# Patient Record
Sex: FEMALE | Race: WHITE
[De-identification: names, ages, dates, MRNs, and addresses within clinical notes are randomized per-mention and may not be internally consistent; named-entity substitution may affect disease eponyms.]

---

## 2020-10-23 ENCOUNTER — HOSPITAL ENCOUNTER (OUTPATIENT)
Dept: HOSPITAL 47 - EC | Age: 70
Setting detail: OBSERVATION
LOS: 2 days | Discharge: HOME | End: 2020-10-25
Attending: HOSPITALIST | Admitting: HOSPITALIST
Payer: MEDICARE

## 2020-10-23 DIAGNOSIS — I48.0: Primary | ICD-10-CM

## 2020-10-23 DIAGNOSIS — Z79.01: ICD-10-CM

## 2020-10-23 DIAGNOSIS — E78.5: ICD-10-CM

## 2020-10-23 DIAGNOSIS — Z79.82: ICD-10-CM

## 2020-10-23 DIAGNOSIS — Z79.899: ICD-10-CM

## 2020-10-23 DIAGNOSIS — M81.0: ICD-10-CM

## 2020-10-23 DIAGNOSIS — I10: ICD-10-CM

## 2020-10-23 DIAGNOSIS — F17.200: ICD-10-CM

## 2020-10-23 DIAGNOSIS — Z79.83: ICD-10-CM

## 2020-10-23 LAB
ALBUMIN SERPL-MCNC: 4.5 G/DL (ref 3.5–5)
ALP SERPL-CCNC: 65 U/L (ref 38–126)
ALT SERPL-CCNC: 15 U/L (ref 4–34)
ANION GAP SERPL CALC-SCNC: 7 MMOL/L
APTT BLD: 23.2 SEC (ref 22–30)
AST SERPL-CCNC: 35 U/L (ref 14–36)
BASOPHILS # BLD AUTO: 0 K/UL (ref 0–0.2)
BASOPHILS NFR BLD AUTO: 0 %
BUN SERPL-SCNC: 19 MG/DL (ref 7–17)
CALCIUM SPEC-MCNC: 9.4 MG/DL (ref 8.4–10.2)
CHLORIDE SERPL-SCNC: 105 MMOL/L (ref 98–107)
CO2 SERPL-SCNC: 26 MMOL/L (ref 22–30)
EOSINOPHIL # BLD AUTO: 0 K/UL (ref 0–0.7)
EOSINOPHIL NFR BLD AUTO: 0 %
ERYTHROCYTE [DISTWIDTH] IN BLOOD BY AUTOMATED COUNT: 4.77 M/UL (ref 3.8–5.4)
ERYTHROCYTE [DISTWIDTH] IN BLOOD: 13 % (ref 11.5–15.5)
GLUCOSE SERPL-MCNC: 105 MG/DL (ref 74–99)
HCT VFR BLD AUTO: 44.7 % (ref 34–46)
HGB BLD-MCNC: 14.8 GM/DL (ref 11.4–16)
HYALINE CASTS UR QL AUTO: 1 /LPF (ref 0–2)
INR PPP: 0.9 (ref ?–1.2)
LYMPHOCYTES # SPEC AUTO: 1.8 K/UL (ref 1–4.8)
LYMPHOCYTES NFR SPEC AUTO: 16 %
MAGNESIUM SPEC-SCNC: 1.9 MG/DL (ref 1.6–2.3)
MCH RBC QN AUTO: 31 PG (ref 25–35)
MCHC RBC AUTO-ENTMCNC: 33.1 G/DL (ref 31–37)
MCV RBC AUTO: 93.8 FL (ref 80–100)
MONOCYTES # BLD AUTO: 0.5 K/UL (ref 0–1)
MONOCYTES NFR BLD AUTO: 4 %
NEUTROPHILS # BLD AUTO: 8.3 K/UL (ref 1.3–7.7)
NEUTROPHILS NFR BLD AUTO: 78 %
PH UR: 7 [PH] (ref 5–8)
PLATELET # BLD AUTO: 325 K/UL (ref 150–450)
POTASSIUM SERPL-SCNC: 4.4 MMOL/L (ref 3.5–5.1)
PROT SERPL-MCNC: 7.2 G/DL (ref 6.3–8.2)
PT BLD: 9.6 SEC (ref 9–12)
RBC UR QL: 10 /HPF (ref 0–5)
SODIUM SERPL-SCNC: 138 MMOL/L (ref 137–145)
SP GR UR: 1.01 (ref 1–1.03)
SQUAMOUS UR QL AUTO: <1 /HPF (ref 0–4)
UROBILINOGEN UR QL STRIP: <2 MG/DL (ref ?–2)
WBC # BLD AUTO: 10.7 K/UL (ref 3.8–10.6)
WBC #/AREA URNS HPF: 3 /HPF (ref 0–5)

## 2020-10-23 PROCEDURE — 96375 TX/PRO/DX INJ NEW DRUG ADDON: CPT

## 2020-10-23 PROCEDURE — 36415 COLL VENOUS BLD VENIPUNCTURE: CPT

## 2020-10-23 PROCEDURE — 96361 HYDRATE IV INFUSION ADD-ON: CPT

## 2020-10-23 PROCEDURE — 84443 ASSAY THYROID STIM HORMONE: CPT

## 2020-10-23 PROCEDURE — 96366 THER/PROPH/DIAG IV INF ADDON: CPT

## 2020-10-23 PROCEDURE — 93306 TTE W/DOPPLER COMPLETE: CPT

## 2020-10-23 PROCEDURE — 85025 COMPLETE CBC W/AUTO DIFF WBC: CPT

## 2020-10-23 PROCEDURE — 80061 LIPID PANEL: CPT

## 2020-10-23 PROCEDURE — 83735 ASSAY OF MAGNESIUM: CPT

## 2020-10-23 PROCEDURE — 96365 THER/PROPH/DIAG IV INF INIT: CPT

## 2020-10-23 PROCEDURE — 96376 TX/PRO/DX INJ SAME DRUG ADON: CPT

## 2020-10-23 PROCEDURE — 81001 URINALYSIS AUTO W/SCOPE: CPT

## 2020-10-23 PROCEDURE — 80048 BASIC METABOLIC PNL TOTAL CA: CPT

## 2020-10-23 PROCEDURE — 99285 EMERGENCY DEPT VISIT HI MDM: CPT

## 2020-10-23 PROCEDURE — 85730 THROMBOPLASTIN TIME PARTIAL: CPT

## 2020-10-23 PROCEDURE — 71046 X-RAY EXAM CHEST 2 VIEWS: CPT

## 2020-10-23 PROCEDURE — 85610 PROTHROMBIN TIME: CPT

## 2020-10-23 PROCEDURE — 84484 ASSAY OF TROPONIN QUANT: CPT

## 2020-10-23 PROCEDURE — 80053 COMPREHEN METABOLIC PANEL: CPT

## 2020-10-23 PROCEDURE — 93005 ELECTROCARDIOGRAM TRACING: CPT

## 2020-10-23 RX ADMIN — METOPROLOL TARTRATE SCH MG: 25 TABLET, FILM COATED ORAL at 20:24

## 2020-10-23 RX ADMIN — ATORVASTATIN CALCIUM SCH MG: 20 TABLET, FILM COATED ORAL at 20:24

## 2020-10-23 RX ADMIN — METOPROLOL TARTRATE SCH MG: 25 TABLET, FILM COATED ORAL at 15:11

## 2020-10-23 RX ADMIN — METOPROLOL TARTRATE SCH MG: 1 INJECTION, SOLUTION INTRAVENOUS at 14:29

## 2020-10-23 RX ADMIN — METOPROLOL TARTRATE SCH: 1 INJECTION, SOLUTION INTRAVENOUS at 15:10

## 2020-10-23 NOTE — ED
General Adult HPI





- General


Chief complaint: Arrhythmia/Palpitations


Stated complaint: AFib - sent by pcp


Time Seen by Provider: 10/23/20 12:55


Source: patient, family, RN notes reviewed, old records reviewed


Mode of arrival: ambulatory


Limitations: no limitations





- History of Present Illness


Initial comments: 





This is a 70-year-old female who presents emergency Department complaining of 

having atrial fibrillation.  Patient noticed recently that her heart was beating

irregularly and she she went to her primary medical care doctor today and they 

sent to the emergency department.  Patient does not have any chest pain patient 

denies any shortness of breath or difficulty breathing.  Patient does feel her 

heart beating differently.  Patient denies any headache patient denies 

lightheadedness dizziness or near syncopal episode.  Patient denies any abd

ominal pain patient denies nausea vomiting or diarrhea.





- Related Data


                                Home Medications











 Medication  Instructions  Recorded  Confirmed


 


Alendronate Sodium [Fosamax] 70 mg PO GARDNER 10/23/20 10/23/20


 


Aspirin EC [Ecotrin Low Dose] 81 mg PO DAILY 10/23/20 10/23/20


 


Atorvastatin [Lipitor] 20 mg PO HS 10/23/20 10/23/20


 


Losartan Potassium [Cozaar] 100 mg PO DAILY 10/23/20 10/23/20


 


Psyllium Husk 100% [Metamucil 6 gm PO DAILY 10/23/20 10/23/20





Packet]   


 


Ubidecarenone [Co Q-10] 200 mg PO DAILY 10/23/20 10/23/20


 


amLODIPine [Norvasc] 10 mg PO DAILY 10/23/20 10/23/20


 


hydroCHLOROthiazide [Hydrodiuril] 25 mg PO DAILY 10/23/20 10/23/20











                                    Allergies











Allergy/AdvReac Type Severity Reaction Status Date / Time


 


diltiazem [From Cardizem] Allergy  Rash/Hives Verified 10/23/20 13:01














Review of Systems


ROS Statement: 


Those systems with pertinent positive or pertinent negative responses have been 

documented in the HPI.





ROS Other: All systems not noted in ROS Statement are negative.





Past Medical History


Past Medical History: Atrial Fibrillation, Hyperlipidemia, Hypertension


Additional Past Medical History / Comment(s): osteoperosis


History of Any Multi-Drug Resistant Organisms: None Reported


Past Surgical History: Tubal Ligation


Past Psychological History: No Psychological Hx Reported


Smoking Status: Current every day smoker


Past Alcohol Use History: None Reported


Past Drug Use History: None Reported





General Exam





- General Exam Comments


Initial Comments: 





GENERAL:


Patient is well-developed and well-nourished.  Patient is nontoxic and well-

hydrated and is in no acute distress.





ENT:


Neck is soft and supple.  No significant lymphadenopathy is noted.  Oropharynx 

is clear.  Moist mucous membranes.  Neck has full range of motion without 

eliciting any pain.  





EYES:


The sclera were anicteric and conjunctiva were pink and moist.  Extraocular 

movements were intact and pupils were equal round and reactive to light.  

Eyelids were unremarkable.





PULMONARY:


Unlabored respirations.  Good breath sounds bilaterally.  No audible rales 

rhonchi or wheezing was noted.





CARDIOVASCULAR:


Patient is tachycardic with a regular rate and rhythm it's at about 130 beats a 

minute





ABDOMEN:


Soft and nontender with normal bowel sounds. 





SKIN:


Skin is clear with no lesions or rashes and otherwise unremarkable.





NEUROLOGIC:


Patient is alert and oriented x3.  Cranial nerves II through XII are grossly 

intact.  Motor and sensory are also intact.  Normal speech, volume and content. 

Symmetrical smile. 





MUSCULOSKELETAL:


Normal extremities with adequate strength and full range of motion.  No lower 

extremity swelling or edema.  No calf tenderness.





LYMPHATICS:


No significant lymphadenopathy is noted





PSYCHIATRIC:


Normal psychiatric evaluation. 


Limitations: no limitations





Course


                                   Vital Signs











  10/23/20 10/23/20 10/23/20





  12:55 13:30 14:29


 


Temperature 98.7 F  


 


Pulse Rate 87 135 H 103 H


 


Respiratory 18 18 18





Rate   


 


Blood Pressure 124/62 136/109 119/76


 


O2 Sat by Pulse 100 97 98





Oximetry   














  10/23/20 10/23/20





  14:30 14:36


 


Temperature  


 


Pulse Rate 128 H 95


 


Respiratory 18 18





Rate  


 


Blood Pressure 119/76 138/85


 


O2 Sat by Pulse 99 97





Oximetry  














Medical Decision Making





- Medical Decision Making





EKG shows atrial for ablation with rapid ventricular response at 124 bpm QRS is 

78 QT interval is 294 QTC is 422 per patient's EKG shows no ST segment elevation

however there is some T-wave inversion in precordial leads V5 and V6.





Chest x-ray shows no acute abnormality.





Patient is ALLERGIC to Cardizem so I started the patient on IV metoprolol.





[facility] 

the patient wrote admitting orders I consult cardiology and I continued 

metoprolol by mouth.





- Lab Data


Result diagrams: 


                                 10/23/20 13:28





                                 10/23/20 13:28


                                   Lab Results











  10/23/20 10/23/20 10/23/20 Range/Units





  13:28 13:28 13:28 


 


WBC  10.7 H    (3.8-10.6)  k/uL


 


RBC  4.77    (3.80-5.40)  m/uL


 


Hgb  14.8    (11.4-16.0)  gm/dL


 


Hct  44.7    (34.0-46.0)  %


 


MCV  93.8    (80.0-100.0)  fL


 


MCH  31.0    (25.0-35.0)  pg


 


MCHC  33.1    (31.0-37.0)  g/dL


 


RDW  13.0    (11.5-15.5)  %


 


Plt Count  325    (150-450)  k/uL


 


Neutrophils %  78    %


 


Lymphocytes %  16    %


 


Monocytes %  4    %


 


Eosinophils %  0    %


 


Basophils %  0    %


 


Neutrophils #  8.3 H    (1.3-7.7)  k/uL


 


Lymphocytes #  1.8    (1.0-4.8)  k/uL


 


Monocytes #  0.5    (0-1.0)  k/uL


 


Eosinophils #  0.0    (0-0.7)  k/uL


 


Basophils #  0.0    (0-0.2)  k/uL


 


PT   9.6   (9.0-12.0)  sec


 


INR   0.9   (<1.2)  


 


APTT   23.2   (22.0-30.0)  sec


 


Sodium    138  (137-145)  mmol/L


 


Potassium    4.4  (3.5-5.1)  mmol/L


 


Chloride    105  ()  mmol/L


 


Carbon Dioxide    26  (22-30)  mmol/L


 


Anion Gap    7  mmol/L


 


BUN    19 H  (7-17)  mg/dL


 


Creatinine    0.75  (0.52-1.04)  mg/dL


 


Est GFR (CKD-EPI)AfAm    >90  (>60 ml/min/1.73 sqM)  


 


Est GFR (CKD-EPI)NonAf    81  (>60 ml/min/1.73 sqM)  


 


Glucose    105 H  (74-99)  mg/dL


 


Calcium    9.4  (8.4-10.2)  mg/dL


 


Magnesium    1.9  (1.6-2.3)  mg/dL


 


Total Bilirubin    1.1  (0.2-1.3)  mg/dL


 


AST    35  (14-36)  U/L


 


ALT    15  (4-34)  U/L


 


Alkaline Phosphatase    65  ()  U/L


 


Troponin I     (0.000-0.034)  ng/mL


 


Total Protein    7.2  (6.3-8.2)  g/dL


 


Albumin    4.5  (3.5-5.0)  g/dL


 


TSH    1.510  (0.465-4.680)  mIU/L














  10/23/20 Range/Units





  13:28 


 


WBC   (3.8-10.6)  k/uL


 


RBC   (3.80-5.40)  m/uL


 


Hgb   (11.4-16.0)  gm/dL


 


Hct   (34.0-46.0)  %


 


MCV   (80.0-100.0)  fL


 


MCH   (25.0-35.0)  pg


 


MCHC   (31.0-37.0)  g/dL


 


RDW   (11.5-15.5)  %


 


Plt Count   (150-450)  k/uL


 


Neutrophils %   %


 


Lymphocytes %   %


 


Monocytes %   %


 


Eosinophils %   %


 


Basophils %   %


 


Neutrophils #   (1.3-7.7)  k/uL


 


Lymphocytes #   (1.0-4.8)  k/uL


 


Monocytes #   (0-1.0)  k/uL


 


Eosinophils #   (0-0.7)  k/uL


 


Basophils #   (0-0.2)  k/uL


 


PT   (9.0-12.0)  sec


 


INR   (<1.2)  


 


APTT   (22.0-30.0)  sec


 


Sodium   (137-145)  mmol/L


 


Potassium   (3.5-5.1)  mmol/L


 


Chloride   ()  mmol/L


 


Carbon Dioxide   (22-30)  mmol/L


 


Anion Gap   mmol/L


 


BUN   (7-17)  mg/dL


 


Creatinine   (0.52-1.04)  mg/dL


 


Est GFR (CKD-EPI)AfAm   (>60 ml/min/1.73 sqM)  


 


Est GFR (CKD-EPI)NonAf   (>60 ml/min/1.73 sqM)  


 


Glucose   (74-99)  mg/dL


 


Calcium   (8.4-10.2)  mg/dL


 


Magnesium   (1.6-2.3)  mg/dL


 


Total Bilirubin   (0.2-1.3)  mg/dL


 


AST   (14-36)  U/L


 


ALT   (4-34)  U/L


 


Alkaline Phosphatase   ()  U/L


 


Troponin I  <0.012  (0.000-0.034)  ng/mL


 


Total Protein   (6.3-8.2)  g/dL


 


Albumin   (3.5-5.0)  g/dL


 


TSH   (0.465-4.680)  mIU/L














Critical Care Time


Critical Care Time: Yes


Total Critical Care Time: 35





Disposition


Clinical Impression: 


 Atrial fibrillation with rapid ventricular response





Disposition: ADMITTED AS IP TO THIS HOSP


Referrals: 


Srinivasan Olivares DO [Primary Care Provider] - 1-2 days


Time of Disposition: 14:50

## 2020-10-23 NOTE — XR
EXAMINATION TYPE: XR chest 2V

 

DATE OF EXAM: 10/23/2020

 

COMPARISON: NONE

 

HISTORY: Dysrhythmia

 

TECHNIQUE:  Frontal and lateral views of the chest are obtained.

 

FINDINGS: Patient is rotated. There are overlying cardiac leads. Prominent lung volumes with flatteni
ng the hemidiaphragms may be indicative of underlying COPD. There is no focal air space opacity, pleu
ral effusion, or pneumothorax seen.  The cardiac silhouette size is within normal limits.   The osseo
us structures are intact, there is likely an underlying scoliosis.

 

IMPRESSION:  No acute cardiopulmonary process.

## 2020-10-24 VITALS — RESPIRATION RATE: 16 BRPM

## 2020-10-24 LAB
ANION GAP SERPL CALC-SCNC: 7 MMOL/L
BASOPHILS # BLD AUTO: 0.1 K/UL (ref 0–0.2)
BASOPHILS NFR BLD AUTO: 1 %
BUN SERPL-SCNC: 17 MG/DL (ref 7–17)
CALCIUM SPEC-MCNC: 9.3 MG/DL (ref 8.4–10.2)
CHLORIDE SERPL-SCNC: 106 MMOL/L (ref 98–107)
CHOLEST SERPL-MCNC: 160 MG/DL (ref ?–200)
CO2 SERPL-SCNC: 27 MMOL/L (ref 22–30)
EOSINOPHIL # BLD AUTO: 0.1 K/UL (ref 0–0.7)
EOSINOPHIL NFR BLD AUTO: 1 %
ERYTHROCYTE [DISTWIDTH] IN BLOOD BY AUTOMATED COUNT: 4.6 M/UL (ref 3.8–5.4)
ERYTHROCYTE [DISTWIDTH] IN BLOOD: 13 % (ref 11.5–15.5)
GLUCOSE SERPL-MCNC: 101 MG/DL (ref 74–99)
HCT VFR BLD AUTO: 43.2 % (ref 34–46)
HDLC SERPL-MCNC: 43 MG/DL (ref 40–60)
HGB BLD-MCNC: 14 GM/DL (ref 11.4–16)
LDLC SERPL CALC-MCNC: 95 MG/DL (ref 0–99)
LYMPHOCYTES # SPEC AUTO: 2.6 K/UL (ref 1–4.8)
LYMPHOCYTES NFR SPEC AUTO: 35 %
MCH RBC QN AUTO: 30.4 PG (ref 25–35)
MCHC RBC AUTO-ENTMCNC: 32.3 G/DL (ref 31–37)
MCV RBC AUTO: 94 FL (ref 80–100)
MONOCYTES # BLD AUTO: 0.5 K/UL (ref 0–1)
MONOCYTES NFR BLD AUTO: 6 %
NEUTROPHILS # BLD AUTO: 4 K/UL (ref 1.3–7.7)
NEUTROPHILS NFR BLD AUTO: 55 %
PLATELET # BLD AUTO: 279 K/UL (ref 150–450)
POTASSIUM SERPL-SCNC: 4.1 MMOL/L (ref 3.5–5.1)
SODIUM SERPL-SCNC: 140 MMOL/L (ref 137–145)
TRIGL SERPL-MCNC: 111 MG/DL (ref ?–150)
WBC # BLD AUTO: 7.4 K/UL (ref 3.8–10.6)

## 2020-10-24 RX ADMIN — APIXABAN SCH MG: 5 TABLET, FILM COATED ORAL at 20:23

## 2020-10-24 RX ADMIN — Medication SCH UNIT: at 09:49

## 2020-10-24 RX ADMIN — METOPROLOL TARTRATE SCH MG: 25 TABLET, FILM COATED ORAL at 09:48

## 2020-10-24 RX ADMIN — METOPROLOL TARTRATE SCH MG: 25 TABLET, FILM COATED ORAL at 21:50

## 2020-10-24 RX ADMIN — APIXABAN SCH MG: 5 TABLET, FILM COATED ORAL at 12:37

## 2020-10-24 RX ADMIN — METOPROLOL TARTRATE SCH MG: 25 TABLET, FILM COATED ORAL at 17:23

## 2020-10-24 RX ADMIN — ATORVASTATIN CALCIUM SCH MG: 20 TABLET, FILM COATED ORAL at 20:23

## 2020-10-24 RX ADMIN — LOSARTAN POTASSIUM SCH MG: 50 TABLET, FILM COATED ORAL at 09:49

## 2020-10-24 NOTE — DS
DISCHARGE SUMMARY



DATE OF SERVICE:

10/24/2020



FINAL DIAGNOSES:

1. Atrial fibrillation with fast ventricular rate with paroxysmal atrial fibrillation.

2. Increased WBC.

3. History of paroxysmal atrial fibrillation.

4. Hypertension.

5. Hyperlipidemia.

6. History of osteoporosis.

7. History of tubal ligation.

8. Continued ongoing nicotine dependence.



DISCHARGE DISPOSITION:

Patient will be discharged in stable condition with guarded prognosis. Cardiology

cleared the patient for discharge.



HISTORY OF PRESENT ILLNESS:

This 70-year-old woman with a past medical history of multiple medical problems

admitted with atrial fibrillation with fast ventricular rate.  Patient was treated

medically.  Patient improved significantly cardiology saw the patient and recommended

anticoagulation with Eliquis.



On exam, vitals are stable.  Cardiovascular: S1, S2.  Abdomen soft.  Nervous system: No

focal deficits.



DISCHARGE ADVICE AND MEDICATIONS:

1. Diet is cardiac diet.

2. Activity limited until followup.

3. Follow up with Dr. Olivares in 1-2 days.

4. Follow up with Cardiology as recommended.



DISCHARGE MEDICATIONS:

1. Coenzyme Q10 100 mg daily.

2. Cozaar 100 mg daily.

3. Ecotrin 81 mg daily.

4. Fosamax 70 mg Sunday.

5. HydroDIURIL 25 mg.

6. Lipitor 20 mg q.h.s.

7. Psyllium husk 6 g p.o. daily.

8. Norvasc 10 mg p.o. daily.

9. Vitamin D3 2000 daily.

10.Eliquis 5 mg p.o. b.i.d.

11.Lopressor 25 mg p.o. t.i.d.

Once again the patient discharged in stable condition with guarded prognosis.





MMODL / IJN: [id number] / Job#: [id number] This writer spoke with patient's son this AM at the request of this patient. Per son, patient and son were told that patient would be discharging this morning and son was wondering what time he should get his mom. Per latest MD &  notes, patient should DC either Sunday or Monday. Son, again, stated that he had personally talked to the MD about discharging his mother today instead of Sun or Mon. This writer apologized for the misunderstanding and reassured the son that the MD can touch base with him again today to talk about discharge planning. Oncoming RN aware.

## 2020-10-24 NOTE — PN
PROGRESS NOTE



DATE OF SERVICE:

10/24/2020



This 70-year-old woman was admitted with atrial fibrillation with fast ventricular rate

is being closely monitored.  No chest pain.  No palpitations.  No fever.



PHYSICAL EXAMINATION:

Alert and oriented times three. Pulse 87, blood pressure 100/60.  Respirations 16,

temperature 97.8, pulse ox 97% on room air.

HEENT is conjunctivae normal.

NECK: No JVD.

CARDIOVASCULAR: S1, S2 normal.

RESPIRATORY:  Breath sounds diminished in the bases.  A few rhonchi.  No crackles.

ABDOMEN:  Soft.

NERVOUS SYSTEM: No focal deficits.



LABS:

Noted.



ASSESSMENT:

1. Atrial fibrillation with fast ventricular rate with paroxysmal atrial fibrillation.

2. Increased WBC.

3. History of paroxysmal atrial fibrillation.

4. Hypertension.

5. Hyperlipidemia.

6. History of degenerative joint disease.

7. History of tubal ligation.

8. Continued ongoing nicotine dependence.



RECOMMENDATIONS AND DISCUSSION:

Recommend to continue current medications, management and symptomatic treatment.

Continue Eliquis.  Closely follow with Cardiology.  Guarded prognosis. Further

recommendations to follow.





MMODL / IJN: [id number] / Job#: [id number]

## 2020-10-24 NOTE — P.CRDCN
History of Present Illness


Consult date: 10/24/20


History of present illness: 





CHIEF COMPLAINT:  A. flavia with RVR





HISTORY OF PRESENT ILLNESS:  This is a 70-year old female with a past medical 

history significant for hypertension, hyperlipidemia, and atrial fibrillation 

with cardioversion. Patient states she does not follow with a cardiologist.  We 

have been asked to see the patient in consultation for A. flavia with RVR.  Patient

examined this morning at the bedside.  Patient states she has been feeling tired

for the past 2-3 days and hasn't really known why.  She denies any complaints of

chest pain or pressure.  Denies shortness of breath.  Denies palpitations.  She 

states that she had her  listen to her heart and he told her it was 

beating fast and beating irregularly so she came to the hospital for further 

evaluation.  Patient reports having a history of atrial fibrillation 

approximately 10 years ago.  She was treated with Coumadin until she underwent a

successful cardioversion.





DIAGNOSTICS:


EKG reveals atrial fibrillation with RVR


Chest xray negative for acute cardiopulmonary process


Laboratory data: WBC 10.7.  Hemoglobin 14.8.  Platelet count 325.  Sodium 138.  

Potassium 4.4.  BUN 19.  Creatinine 0.75.  Magnesium 1.9.  Troponin negative 3.


Current home cardiac medications include Lipitor 20 mg daily, aspirin 81 mg 

daily, hydrochlorothiazide 25 mg daily, Norvasc 10 mg daily, and Cozaar 100 mg 

daily.





REVIEW OF SYSTEMS: 


At the time of my exam:


CONSTITUTIONAL: Denies fever or chills.


HEENT: Denies blurred vision, vision changes, or eye pain. Denies hemoptysis 


CARDIOVASCULAR: Denies chest pain, orthopnea, PND or palpitations


RESPIRATORY: No shortness of breath. 


GASTROINTESTINAL: Denies abdominal pain. Denies nausea or vomiting. 


HEMATOLOGIC: Denies bleeding disorders.


GENITOURINARY:  Denies any blood in urine.


SKIN: Denies pruitis. Denies rash.





PHYSICAL EXAM: 


VITAL SIGNS: Reviewed.


GENERAL: Well-developed in no acute distress. 


HEENT: Head is normocephalic. Pupils are equal, round. Sclerae anicteric. Mucous

membranes of the mouth are moist. Neck supple. No JVD or thyromegaly


LUNGS: Respirations even and unlabored. Lungs essentially clear to auscultation 

bilaterally.


HEART: Irregular rate and rhythm.  S1 and S2 heard.


ABDOMEN: Soft. Nondistended. Nontender.


EXTREMITIES: Normal range of motion.  No clubbing or cyanosis.  Peripheral 

pulses intact.  No lower extremity edema


NEUROLOGIC: Awake and alert. Oriented x 3. 





ASSESSMENT: 


Paroxysmal atrial fibrillation with RVR


History of cardioversion, 10 years ago


Hypertension


Hyperlipidemia


Nicotine dependence





PLAN: 


Resume home cardiac medications


Begin metoprolol 25 mg TID


Rythmol 450 mg 1 dose now


Begin Eliquis 5 mg.  Discontinue heparin drip 2 hours after Eliquis dose given


Obtain 2-D echo to assess cardiac structure and function


Observe patient overnight per Dr. Lynch.  Anticipate discharge home tomorrow.





Nurse practitioner note has been reviewed by physician. Signing provider agrees 

with the documented findings, assessment, and plan of care. 








Past Medical History


Past Medical History: Atrial Fibrillation, Hyperlipidemia, Hypertension


Additional Past Medical History / Comment(s): osteoperosis


History of Any Multi-Drug Resistant Organisms: None Reported


Past Surgical History: Tubal Ligation


Additional Past Surgical History / Comment(s): cardioversion approx 10 years ago

[facility] by Dr Sandoval, Breast biopsy


Past Anesthesia/Blood Transfusion Reactions: No Reported Reaction


Past Psychological History: No Psychological Hx Reported


Smoking Status: Current every day smoker


Past Alcohol Use History: None Reported


Past Drug Use History: None Reported





- Past Family History


  ** Mother


Family Medical History: Cancer


Additional Family Medical History / Comment(s): cancer of the esphagus





  ** Father


Family Medical History: Memory Impairment


Additional Family Medical History / Comment(s): Alzheimers





Medications and Allergies


                                Home Medications











 Medication  Instructions  Recorded  Confirmed  Type


 


Alendronate Sodium [Fosamax] 70 mg PO GARDNER 10/23/20 10/23/20 History


 


Aspirin EC [Ecotrin Low Dose] 81 mg PO DAILY 10/23/20 10/23/20 History


 


Atorvastatin [Lipitor] 20 mg PO HS 10/23/20 10/23/20 History


 


Cholecalciferol [Vitamin D3 (25 2,000 unit PO DAILY 10/23/20 10/23/20 History





Mcg = 1000 Iu)]    


 


Losartan Potassium [Cozaar] 100 mg PO DAILY 10/23/20 10/23/20 History


 


Psyllium Husk 100% [Metamucil 6 gm PO DAILY 10/23/20 10/23/20 History





Packet]    


 


Ubidecarenone [Co Q-10] 200 mg PO DAILY 10/23/20 10/23/20 History


 


amLODIPine [Norvasc] 10 mg PO DAILY 10/23/20 10/23/20 History


 


hydroCHLOROthiazide [Hydrodiuril] 25 mg PO DAILY 10/23/20 10/23/20 History


 


Apixaban [Eliquis] 5 mg PO BID #60 tab 10/24/20  Rx


 


Metoprolol Tartrate [Lopressor] 25 mg PO TID #90 tab 10/24/20  Rx








                                    Allergies











Allergy/AdvReac Type Severity Reaction Status Date / Time


 


diltiazem [From Cardizem] Allergy  Rash/Hives Verified 10/23/20 13:01














Physical Exam


Vitals: 


                                   Vital Signs











  Temp Pulse Pulse Resp BP BP Pulse Ox


 


 10/24/20 09:00  98.1 F   89  16   136/83  98


 


 10/24/20 02:40  97.9 F   86    136/80  95


 


 10/23/20 20:07  98.0 F   98  18   110/69  97


 


 10/23/20 19:45    98    


 


 10/23/20 17:00  98.2 F   106 H  16   123/73  98


 


 10/23/20 16:00   101 H   17  116/75   97


 


 10/23/20 15:30   83   18  114/86   98


 


 10/23/20 15:00   120 H   13  140/98   97


 


 10/23/20 14:36   95   18  138/85   97


 


 10/23/20 14:30   128 H   18  119/76   99


 


 10/23/20 14:29   103 H   18  119/76   98


 


 10/23/20 13:30   135 H   18  136/109   97


 


 10/23/20 12:55  98.7 F  87   18  124/62   100








                                Intake and Output











 10/23/20 10/24/20 10/24/20





 22:59 06:59 14:59


 


Intake Total 239.816 49.193 0


 


Output Total 250  


 


Balance -10.184 49.193 0


 


Intake:   


 


  Intake, IV Titration 49.816 49.193 





  Amount   


 


    Heparin Sod,Pork in 0.45% 49.816 49.193 





    NaCl 25,000 unit In 0.45   





    % NaCl 1 250ml.bag @ 12   





    UNITS/KG/HR 7.185 mls/hr   





    IV .Q24H [facility] Rx#:   





    [id number]   


 


  Oral 190  0


 


Output:   


 


  Urine 250  


 


Other:   


 


  # Voids 1  1


 


  Weight 59.874 kg  














Results





                                 10/24/20 04:30





                                 10/24/20 04:30


                                 Cardiac Enzymes











  10/23/20 10/23/20 10/23/20 Range/Units





  13:28 13:28 16:05 


 


AST  35    (14-36)  U/L


 


Troponin I   <0.012  <0.012  (0.000-0.034)  ng/mL














  10/23/20 Range/Units





  20:06 


 


AST   (14-36)  U/L


 


Troponin I  <0.012  (0.000-0.034)  ng/mL








                                   Coagulation











  10/23/20 10/23/20 10/24/20 Range/Units





  13:28 20:06 04:30 


 


PT  9.6    (9.0-12.0)  sec


 


APTT  23.2  68.8 H  38.4 H  (22.0-30.0)  sec








                                     Lipids











  10/24/20 Range/Units





  04:30 


 


Triglycerides  111  (<150)  mg/dL


 


Cholesterol  160  (<200)  mg/dL


 


HDL Cholesterol  43  (40-60)  mg/dL








                                       CBC











  10/23/20 10/24/20 Range/Units





  13:28 04:30 


 


WBC  10.7 H  7.4  (3.8-10.6)  k/uL


 


RBC  4.77  4.60  (3.80-5.40)  m/uL


 


Hgb  14.8  14.0  (11.4-16.0)  gm/dL


 


Hct  44.7  43.2  (34.0-46.0)  %


 


Plt Count  325  279  (150-450)  k/uL








                          Comprehensive Metabolic Panel











  10/23/20 10/24/20 Range/Units





  13:28 04:30 


 


Sodium  138  140  (137-145)  mmol/L


 


Potassium  4.4  4.1  (3.5-5.1)  mmol/L


 


Chloride  105  106  ()  mmol/L


 


Carbon Dioxide  26  27  (22-30)  mmol/L


 


BUN  19 H  17  (7-17)  mg/dL


 


Creatinine  0.75  0.71  (0.52-1.04)  mg/dL


 


Glucose  105 H  101 H  (74-99)  mg/dL


 


Calcium  9.4  9.3  (8.4-10.2)  mg/dL


 


AST  35   (14-36)  U/L


 


ALT  15   (4-34)  U/L


 


Alkaline Phosphatase  65   ()  U/L


 


Total Protein  7.2   (6.3-8.2)  g/dL


 


Albumin  4.5   (3.5-5.0)  g/dL








                               Current Medications











Generic Name Dose Route Start Last Admin





  Trade Name Lola  PRN Reason Stop Dose Admin


 


Amlodipine Besylate  10 mg  10/24/20 09:00  10/24/20 09:48





  Amlodipine 10 Mg Tab  PO   10 mg





  DAILY JULIANA   Administration


 


Atorvastatin Calcium  20 mg  10/23/20 21:00  10/23/20 20:24





  Atorvastatin 20 Mg Tab  PO   20 mg





  HS JULIANA   Administration


 


Cholecalciferol  2,000 unit  10/24/20 09:00  10/24/20 09:49





  Cholecalciferol 1,000 Unit Tab  PO   2,000 unit





  DAILY JULIANA   Administration


 


Heparin Sodium (Porcine)  0 unit  10/24/20 05:28  10/24/20 05:42





  Heparin Sodium,Porcine 5,000 Unit/Ml 1 Ml Vial  IV   1,496 unit





  PER PROTOCOL PRN   Administration





  Low PTT  





  Protocol  


 


Hydrochlorothiazide  25 mg  10/24/20 09:00  10/24/20 09:48





  Hydrochlorothiazide 25 Mg Tab  PO   25 mg





  DAILY JULIANA   Administration


 


Heparin Sodium/Sodium Chloride  250 mls @ 7.185 mls/hr  10/23/20 13:30  10/24/20

05:24





  25,000 unit/ Sodium Chloride  IV   12 units/kg/hr





  .Q24H JULIANA   7.185 mls/hr





    Titration





  Protocol  





  12 UNITS/KG/HR  


 


Losartan Potassium  100 mg  10/24/20 09:00  10/24/20 09:49





  Losartan 50 Mg Tab  PO   100 mg





  DAILY JULIANA   Administration


 


Metoprolol Tartrate  25 mg  10/23/20 15:00  10/24/20 09:48





  Metoprolol Tartrate 25 Mg Tab  PO   25 mg





  BID JULIANA   Administration


 


Nitroglycerin  0.4 mg  10/23/20 14:51 





  Nitroglycerin Sl Tabs 0.4 Mg Tab  SUBLINGUAL  





  Q5M PRN  





  Chest Pain  


 


Psyllium Hydrophilic Mucilloid  6 gm  10/24/20 21:00 





  Psyllium Husk 100% 6 Gm Packet  PO  





  DAILY@2100 JULIANA  








                                Intake and Output











 10/23/20 10/24/20 10/24/20





 22:59 06:59 14:59


 


Intake Total 239.816 49.193 0


 


Output Total 250  


 


Balance -10.184 49.193 0


 


Intake:   


 


  Intake, IV Titration 49.816 49.193 





  Amount   


 


    Heparin Sod,Pork in 0.45% 49.816 49.193 





    NaCl 25,000 unit In 0.45   





    % NaCl 1 250ml.bag @ 12   





    UNITS/KG/HR 7.185 mls/hr   





    IV .Q24H [facility] Rx#:   





    [id number]   


 


  Oral 190  0


 


Output:   


 


  Urine 250  


 


Other:   


 


  # Voids 1  1


 


  Weight 59.874 kg  








                                        





                                 10/24/20 04:30 





                                 10/24/20 04:30

## 2020-10-24 NOTE — ECHOF
Referral Reason:A. Fib



MEASUREMENTS

--------

HEIGHT: 165.1 cm

WEIGHT: 59.9 kg

BP: 

RVIDd:   2.9 cm     (< 3.3)

IVSd:   1.1 cm     (0.6 - 1.1)

LVIDd:   4.5 cm     (3.9 - 5.3)

LVPWd:   1.3 cm     (0.6 - 1.1)

IVSs:   1.5 cm

LVIDs:   3.4 cm

LVPWs:   1.7 cm

LA Diam:   4.4 cm     (2.7 - 3.8)

LAESV Index (A-L):   31.85 ml/m

Ao Diam:   2.7 cm     (2.0 - 3.7)

AV Cusp:   1.5 cm     (1.5 - 2.6)

MV EXCURSION:   20.130 mm     (> 18.000)

MV EF SLOPE:   96 mm/s     (70 - 150)

EPSS:   0.5 cm

RAP:   5.00 mmHg

RVSP:   27.65 mmHg







FINDINGS

--------

Atrial fibrillation.

LV size, wall thickness and systolic function are normal, with an EF greater than 55%.   The left dilcia
tricular size is normal.

The right ventricle is normal in size.

The left atrium is mildly dilated.   LA is midly dilated 29-33ml/m2.

The right atrial size is normal.

The aortic valve is trileaflet, and appears structurally normal. No aortic stenosis or regurgitation.


Mild mitral regurgitation is present.

Mild tricuspid regurgitation present.   Right ventricular systolic pressure is normal at < 35 mmHg.

There is no pulmonic regurgitation present.

The aortic root size is normal.

There is no pericardial effusion.



CONCLUSIONS

--------

1. LV size, wall thickness and systolic function are normal, with an EF greater than 55%.

2. The left ventricular size is normal.

3. The right ventricle is normal in size.

4. The left atrium is mildly dilated.

5. LA is midly dilated 29-33ml/m2.

6. The right atrial size is normal.

7. Mild mitral regurgitation is present.

8. Mild tricuspid regurgitation present.

9. The aortic root size is normal.

10. There is no pericardial effusion.





SONOGRAPHER: Gladys Quigley RDCS

## 2020-10-25 VITALS — TEMPERATURE: 97.9 F | DIASTOLIC BLOOD PRESSURE: 89 MMHG | HEART RATE: 66 BPM | SYSTOLIC BLOOD PRESSURE: 137 MMHG

## 2020-10-25 LAB
ANION GAP SERPL CALC-SCNC: 6 MMOL/L
BASOPHILS # BLD AUTO: 0 K/UL (ref 0–0.2)
BASOPHILS NFR BLD AUTO: 1 %
BUN SERPL-SCNC: 21 MG/DL (ref 7–17)
CALCIUM SPEC-MCNC: 9.4 MG/DL (ref 8.4–10.2)
CHLORIDE SERPL-SCNC: 104 MMOL/L (ref 98–107)
CO2 SERPL-SCNC: 29 MMOL/L (ref 22–30)
EOSINOPHIL # BLD AUTO: 0.1 K/UL (ref 0–0.7)
EOSINOPHIL NFR BLD AUTO: 1 %
ERYTHROCYTE [DISTWIDTH] IN BLOOD BY AUTOMATED COUNT: 4.58 M/UL (ref 3.8–5.4)
ERYTHROCYTE [DISTWIDTH] IN BLOOD: 13 % (ref 11.5–15.5)
GLUCOSE SERPL-MCNC: 113 MG/DL (ref 74–99)
HCT VFR BLD AUTO: 43.2 % (ref 34–46)
HGB BLD-MCNC: 14.1 GM/DL (ref 11.4–16)
LYMPHOCYTES # SPEC AUTO: 2.8 K/UL (ref 1–4.8)
LYMPHOCYTES NFR SPEC AUTO: 38 %
MCH RBC QN AUTO: 30.9 PG (ref 25–35)
MCHC RBC AUTO-ENTMCNC: 32.7 G/DL (ref 31–37)
MCV RBC AUTO: 94.5 FL (ref 80–100)
MONOCYTES # BLD AUTO: 0.6 K/UL (ref 0–1)
MONOCYTES NFR BLD AUTO: 8 %
NEUTROPHILS # BLD AUTO: 3.7 K/UL (ref 1.3–7.7)
NEUTROPHILS NFR BLD AUTO: 51 %
PLATELET # BLD AUTO: 294 K/UL (ref 150–450)
POTASSIUM SERPL-SCNC: 4.1 MMOL/L (ref 3.5–5.1)
SODIUM SERPL-SCNC: 139 MMOL/L (ref 137–145)
WBC # BLD AUTO: 7.3 K/UL (ref 3.8–10.6)

## 2020-10-25 RX ADMIN — METOPROLOL TARTRATE SCH MG: 25 TABLET, FILM COATED ORAL at 09:23

## 2020-10-25 RX ADMIN — Medication SCH UNIT: at 09:23

## 2020-10-25 RX ADMIN — APIXABAN SCH MG: 5 TABLET, FILM COATED ORAL at 09:23

## 2020-10-25 RX ADMIN — LOSARTAN POTASSIUM SCH MG: 50 TABLET, FILM COATED ORAL at 09:23

## 2020-10-25 NOTE — P.PN
Subjective


Progress Note Date: 10/25/20





CHIEF COMPLAINT:  A. fib with RVR





HISTORY OF PRESENT ILLNESS:  patient examined this morning at the bedside.  She 

denies chest pain or pressure.  Denies shortness of breath.  She remains 

atrophic ablation.  Heart rate is controlled.  She is anxious to be discharged 

home.





PHYSICAL EXAM: 


VITAL SIGNS: Reviewed.


GENERAL: Well-developed in no acute distress. 


HEENT: Head is normocephalic. Pupils are equal, round. Sclerae anicteric. Mucous

membranes of the mouth are moist. Neck supple. No JVD or thyromegaly


LUNGS: Respirations even and unlabored. Lungs essentially clear to auscultation 

bilaterally.


HEART: Irregular rate and rhythm.  S1 and S2 heard.


ABDOMEN: Soft. Nondistended. Nontender.


EXTREMITIES: Normal range of motion.  No clubbing or cyanosis.  Peripheral 

pulses intact.  No lower extremity edema


NEUROLOGIC: Awake and alert. Oriented x 3. 





ASSESSMENT: 


Persistent atrial fibrillation with RVR


History of cardioversion, 10 years ago


Hypertension


Hyperlipidemia


Nicotine dependence





PLAN: 


Continue current dose of metoprolol


Continue Eliquis for anticoagulation


Patient may be discharged home today from a cardiac standpoint.  She is to 

follow-up with Dr. Lynch in 2 weeks.  If patient remains in atrial fibrillation 

she will undergo cardioversion after 4 weeks of being anticoagulated.





Nurse practitioner note has been reviewed by physician. Signing provider agrees 

with the documented findings, assessment, and plan of care. 








Objective





- Vital Signs


Vital signs: 


                                   Vital Signs











Temp  97.9 F   10/25/20 08:32


 


Pulse  66   10/25/20 08:32


 


Resp  16   10/25/20 08:32


 


BP  137/89   10/25/20 08:32


 


Pulse Ox  97   10/25/20 08:32








                                 Intake & Output











 10/24/20 10/25/20 10/25/20





 18:59 06:59 18:59


 


Intake Total 250 350 200


 


Output Total  275 


 


Balance 250 75 200


 


Intake:   


 


  Oral 250 350 200


 


Output:   


 


  Urine  275 


 


Other:   


 


  # Voids 2 1 1














- Labs


CBC & Chem 7: 


                                 10/25/20 07:37





                                 10/25/20 07:37


Labs: 


                  Abnormal Lab Results - Last 24 Hours (Table)











  10/24/20 10/25/20 Range/Units





  11:06 07:37 


 


APTT  37.2 H   (22.0-30.0)  sec


 


BUN   21 H  (7-17)  mg/dL


 


Glucose   113 H  (74-99)  mg/dL

## 2020-10-25 NOTE — DS
DISCHARGE SUMMARY



DATE OF SERVICE:

10/25/2020



FINAL DIAGNOSES:

1. Atrial fibrillation with fast ventricular rate with paroxysmal atrial fibrillation.

2. Increased WBC.

3. History of paroxysmal atrial fibrillation.

4. Hypertension.

5. Hyperlipidemia.

6. History of degenerative joint disease.

7. History of tubal ligation.

8. Continued ongoing nicotine dependence.



DISCHARGE DISPOSITION:

The patient will be discharged in stable condition with guarded prognosis.



HISTORY OF PRESENT ILLNESS:

This 70-year-old woman with a past medical history with multiple medical problems,

admitted with atrial fibrillation with fast ventricular rate, patient was treated

symptomatically.  Patient was given beta blockers.  Rate was controlled.  Cardiology

saw the patient and recommended outpatient followup.  The patient was also started on

anticoagulation.



On exam, vitals are stable.  Cardiovascular: S1, S2.  Abdomen soft.  Nervous system: No

focal deficits.



DISCHARGE ADVICE AND MEDICATIONS:

1. Diet is cardiac diet.

2. Activity limited until followup.

3. Follow up with Dr. Srinivasan Olivares in 1-2 days.

4. Follow up with Dr. MANISH Lynch in 2 weeks.



DISCHARGE MEDICATIONS:

1. Coenzyme Q 200 mg p.o. daily.

2. Cozaar 100 mg p.o. daily.

3. Ecotrin 81 mg daily.

4. Fosamax 70 mg p.o. Sunday.

5. HydroDIURIL 25 mg p.o. daily.

6. Lipitor 20 mg q.h.s.

7. Metamucil 6 g p.o. daily.

8. Norvasc 10 mg p.o. daily.

9. Vitamin D3 2000 daily.

10.Eliquis 5 mg p.o. b.i.d.

11.Lopressor 25 mg p.o. t.i.d.

Followup labs, CBC and BMP with Dr. Olivares.  Once again, the patient being

discharged in stable condition with guarded prognosis.





MMODL / IJN: [id number] / Job#: [id number]

## 2020-11-13 ENCOUNTER — HOSPITAL ENCOUNTER (OUTPATIENT)
Dept: HOSPITAL 47 - RADUSWWP | Age: 70
Discharge: HOME | End: 2020-11-13
Attending: FAMILY MEDICINE
Payer: MEDICARE

## 2020-11-13 DIAGNOSIS — E04.2: Primary | ICD-10-CM

## 2020-11-13 PROCEDURE — 76536 US EXAM OF HEAD AND NECK: CPT

## 2020-11-13 NOTE — US
EXAMINATION TYPE: US thyroid st tissue head/neck

 

DATE OF EXAM: 11/13/2020

 

COMPARISON: NONE

 

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

 

TECHNIQUE: Multiple sonographic images of the thyroid gland are obtained.

 

FINDINGS:

 

GLAND SIZE:

 

Right Lobe: 3.9 x 1.3 x 1.3 cm

** Overall Parenchyma:  heterogenous

Left Lobe: 4.2 x 1.2 x 1.4 cm

** Overall Parenchyma:  heterogeneous

Isthmus Thickness:  0.3 cm

 

NODULES

 

RIGHT:   # of nodules measured on right: 2

1.  0.9 X 0.9  x 0.9 cm isoechoic solid nodule at the mid pole with poorly defined margins; .  This n
odule is wider than tall and shows intranodular vascularity.

** Prior size: no previous  

2. 0.6 X 0.5  x 0.6 cm mixed solid cystic nodule at the mid pole with well-defined margins; .  This n
odule is wider than tall and shows intranodular vascularity.

 ** Prior size: no previous 

 

LEFT:    # of nodules measured on left:  1

1.  0.8 X 0.6  x 0.7 cm heterogeneous solid nodule at the lower pole with poorly defined margins; .  
This nodule is wider than tall and shows intranodular vascularity.

** Prior size: no previous

 

 

ISTHMUS:    # of nodules measured in the isthmus:  0

 

 

Bilateral neck scanned, no evidence of lymphadenopathy.

 

 

 

 

 

IMPRESSION:

2 nodules on the right measuring 9 mm and 6 mm and one nodule on the left measuring 8 mm. These are p
rimarily solid and follow-up is recommended.

## 2023-04-25 NOTE — HP
HISTORY AND PHYSICAL



DATE OF SERVICE:

10/23/2020



CHIEF COMPLAINT:

Palpitations.



HISTORY OF PRESENT ILLNESS:

This 70-year-old woman with a past medical history of atrial fibrillation,

hypertension, hyperlipidemia, osteoporosis, being followed by Dr. Olivares in the

outpatient setting, apparently had an episode of atrial fibrillation about 10 years

ago. Currently the patient is admitted with palpitations, and emergency room evaluation

showed heart rate of 124.  The patient has ALLERGY TO CARDIZEM. Because of that, the

patient was Lopressor IV followed by 25 mg b.i.d.  Patient is being closely monitored

at this time.  There is no history of any fever, headache, loss of consciousness,

normal, vomiting, diarrhea, fever, rigor or chills at this time.



PAST MEDICAL HISTORY:

Atrial fibrillation, hypertension, hyperlipidemia, osteoporosis.



MEDICATIONS:

Medications prior to admission include Norvasc 10 mg daily. Otherwise, cholecalciferol,

psyllium, Lipitor, Fosamax, coenzyme Q, Ecotrin, HydroDIURIL, Cozaar. Doses are

reviewed.



ALLERGIES:

CARDIZEM.



FAMILY HISTORY:

History of CAD, carcinoma of esophagus.



SOCIAL HISTORY:

No history of smoking.  No history of alcohol intake.



REVIEW OF SYSTEMS:

ENT: No diminished hearing. No diminished vision.

CARDIOVASCULAR SYSTEM: As mentioned earlier.

RESPIRATORY SYSTEM: As mentioned earlier.

GI: No nausea, vomiting.

: No dysuria or retention.

NERVOUS SYSTEM: No numbness, weakness.

ALLERGY/IMMUNOLOGY: No asthma, hayfever.

MUSCULOSKELETAL: As mentioned earlier.

HEMATOLOGY/ONCOLOGY: No history of anemia.

ENDOCRINE: No history of diabetes, hypothyroidism.

CONSTITUTIONAL: As mentioned earlier.

DERMATOLOGY: Negative.

RHEUMATOLOGY: Negative.

PSYCHIATRY: As mentioned earlier.



PHYSICAL EXAMINATION:

Patient alert and oriented x3. Pulse is 106, blood pressure 123/73, respiration 16,

temperature 98.2, pulse ox 98% on room air.

HEENT: Conjunctivae normal. Oral mucosa moist.

NECK: No jugular venous distention. No carotid bruit. No lymph node enlargement.

CARDIOVASCULAR SYSTEM: S1, S2 irregular. No S3. No S4.

RESPIRATORY SYSTEM: Breath sounds diminished at the bases.  No rhonchi. No crackles.

ABDOMEN: Soft, non-tender. No mass palpable.

LEGS: No edema. No swelling.

NERVOUS SYSTEM: Higher functions as mentioned earlier. Moves all 4 limbs. No focal

motor or sensory deficit.

LYMPHATICS: No lymph node palpable in neck, axillae or groin.

SKIN: No ulcer, rash, bleeding.

JOINTS: No active deforming arthropathy.



LABS:

WBC 10.7. Glucose 105.



ASSESSMENT:

1. Atrial fibrillation with fast ventricular rate.

2. Increased white count.

3. History of paroxysmal atrial fibrillation.

4. Hypertension.

5. Hyperlipidemia.

6. History of osteoporosis.

7. History of tubal ligation.

8. Continued ongoing nicotine dependence.



RECOMMENDATIONS AND DISCUSSION:

In this 70-year-old woman who presented with multiple complex medical issues, we will

monitor the patient closely.  I would recommend continuing the Lopressor 25 mg b.i.d.

and also heparin drip. Otherwise, cardiology consultation. Two-D echo with Doppler.

Decision regarding long-term anticoagulation.  Otherwise, continue the rest of the

medications.  Prognosis guarded.  Further recommendations to follow. Discussed with the

patient. A copy of this dictation is being forwarded to Dr. Olivares, who is the

primary physician.





MMODL / IJN: [id number] / Job#: [id number]
Yes

## 2023-08-08 ENCOUNTER — HOSPITAL ENCOUNTER (OUTPATIENT)
Dept: HOSPITAL 47 - OR | Age: 73
Discharge: HOME | End: 2023-08-08
Attending: INTERNAL MEDICINE
Payer: MEDICARE

## 2023-08-08 VITALS — HEART RATE: 50 BPM

## 2023-08-08 VITALS — RESPIRATION RATE: 18 BRPM

## 2023-08-08 VITALS — TEMPERATURE: 98.5 F

## 2023-08-08 VITALS — SYSTOLIC BLOOD PRESSURE: 160 MMHG | DIASTOLIC BLOOD PRESSURE: 70 MMHG

## 2023-08-08 DIAGNOSIS — E78.5: ICD-10-CM

## 2023-08-08 DIAGNOSIS — F17.210: ICD-10-CM

## 2023-08-08 DIAGNOSIS — I48.19: Primary | ICD-10-CM

## 2023-08-08 DIAGNOSIS — I10: ICD-10-CM

## 2023-08-08 DIAGNOSIS — Z79.899: ICD-10-CM

## 2023-08-08 DIAGNOSIS — Z79.01: ICD-10-CM

## 2023-08-08 PROCEDURE — 92960 CARDIOVERSION ELECTRIC EXT: CPT

## 2023-08-08 PROCEDURE — 93005 ELECTROCARDIOGRAM TRACING: CPT

## 2023-08-08 NOTE — CC
CARDIAC CATHETERIZATION REPORT



PROCEDURE:

Electrical cardioversion.



INDICATION:

Persistent atrial fibrillation.



CLINICAL INFORMATION:

Mrs. Hodan Carrillo is a 73-year-old lady with a history of hypertension,

hyperlipidemia, and paroxysmal atrial fibrillation, who has recently been in atrial

fibrillation persistently in spite of amiodarone and she was brought in for electrical

cardioversion.  She is adequately anticoagulated with Eliquis 5 mg b.i.d.  Risks,

benefits, options, and rationale were explained.



PROCEDURE NOTE:

Under the influence of ultra-short-acting intravenous anesthetic agent with the

attendance of the anesthesiologist Dr. Hairston, a single shock was delivered to the

chest wall with anterior and posterior patches of 120 joules.  She converted to sinus

rhythm, remained hemodynamically stable and neurologically intact.  This was a

successful electrical cardioversion.  She will be discharged later on today if she

remains stable and I will see her in the office in 1 week.





MMODL / IJN: [id number] / Job#: [id number]

## 2023-09-20 ENCOUNTER — HOSPITAL ENCOUNTER (OUTPATIENT)
Dept: HOSPITAL 47 - RADBDWWP | Age: 73
Discharge: HOME | End: 2023-09-20
Attending: FAMILY MEDICINE
Payer: MEDICARE

## 2023-09-20 DIAGNOSIS — M85.89: Primary | ICD-10-CM

## 2023-09-20 DIAGNOSIS — Z78.0: ICD-10-CM

## 2023-09-20 PROCEDURE — 77080 DXA BONE DENSITY AXIAL: CPT

## 2024-12-17 ENCOUNTER — HOSPITAL ENCOUNTER (OUTPATIENT)
Dept: HOSPITAL 47 - LABWHC1 | Age: 74
Discharge: HOME | End: 2024-12-17
Attending: INTERNAL MEDICINE
Payer: MEDICARE

## 2024-12-17 DIAGNOSIS — Z01.818: Primary | ICD-10-CM

## 2024-12-17 DIAGNOSIS — I48.0: ICD-10-CM

## 2024-12-17 PROCEDURE — 80053 COMPREHEN METABOLIC PANEL: CPT

## 2024-12-17 PROCEDURE — 36415 COLL VENOUS BLD VENIPUNCTURE: CPT

## 2024-12-17 PROCEDURE — 85027 COMPLETE CBC AUTOMATED: CPT

## 2024-12-18 LAB
ALBUMIN SERPL-MCNC: 4.4 G/DL (ref 3.8–4.9)
ALBUMIN/GLOB SERPL: 2.1 RATIO (ref 1.6–3.17)
ALP SERPL-CCNC: 70 U/L (ref 41–126)
ALT SERPL-CCNC: 11 U/L (ref 8–44)
ANION GAP SERPL CALC-SCNC: 9.5 MMOL/L (ref 4–12)
AST SERPL-CCNC: 21 U/L (ref 13–35)
BUN SERPL-SCNC: 14.8 MG/DL (ref 9–27)
BUN/CREAT SERPL: 24.67 RATIO (ref 12–20)
CALCIUM SPEC-MCNC: 9.6 MG/DL (ref 8.7–10.3)
CHLORIDE SERPL-SCNC: 105 MMOL/L (ref 96–109)
CO2 SERPL-SCNC: 28.5 MMOL/L (ref 21.6–31.8)
ERYTHROCYTE [DISTWIDTH] IN BLOOD BY AUTOMATED COUNT: 4.63 X 10*6/UL (ref 4.1–5.2)
ERYTHROCYTE [DISTWIDTH] IN BLOOD: 13.4 % (ref 11.5–14.5)
GLOBULIN SER CALC-MCNC: 2.1 G/DL (ref 1.6–3.3)
GLUCOSE SERPL-MCNC: 109 MG/DL (ref 70–110)
HCT VFR BLD AUTO: 42.9 % (ref 37.2–46.3)
HGB BLD-MCNC: 14 G/DL (ref 12–15)
MCH RBC QN AUTO: 30.2 PG (ref 27–32)
MCHC RBC AUTO-ENTMCNC: 32.6 G/DL (ref 32–37)
MCV RBC AUTO: 92.7 FL (ref 80–97)
NRBC BLD AUTO-RTO: 0 X 10*3/UL (ref 0–0.01)
PLATELET # BLD AUTO: 274 X 10*3/UL (ref 140–440)
POTASSIUM SERPL-SCNC: 4.2 MMOL/L (ref 3.5–5.5)
PROT SERPL-MCNC: 6.5 G/DL (ref 6.2–8.2)
SODIUM SERPL-SCNC: 143 MMOL/L (ref 135–145)
WBC # BLD AUTO: 8.63 X 10*3/UL (ref 4.5–10)

## 2024-12-26 ENCOUNTER — HOSPITAL ENCOUNTER (OUTPATIENT)
Dept: HOSPITAL 47 - CATHCVL | Age: 74
Discharge: HOME | End: 2024-12-26
Attending: INTERNAL MEDICINE
Payer: MEDICARE

## 2024-12-26 VITALS — DIASTOLIC BLOOD PRESSURE: 68 MMHG | HEART RATE: 53 BPM | SYSTOLIC BLOOD PRESSURE: 161 MMHG

## 2024-12-26 VITALS — TEMPERATURE: 98.1 F | RESPIRATION RATE: 18 BRPM

## 2024-12-26 DIAGNOSIS — Z79.899: ICD-10-CM

## 2024-12-26 DIAGNOSIS — Z88.8: ICD-10-CM

## 2024-12-26 DIAGNOSIS — I48.0: ICD-10-CM

## 2024-12-26 DIAGNOSIS — I25.10: Primary | ICD-10-CM

## 2024-12-26 DIAGNOSIS — I10: ICD-10-CM

## 2024-12-26 DIAGNOSIS — Z87.891: ICD-10-CM

## 2024-12-26 DIAGNOSIS — E78.2: ICD-10-CM

## 2024-12-26 DIAGNOSIS — I42.8: ICD-10-CM

## 2024-12-26 PROCEDURE — 93458 L HRT ARTERY/VENTRICLE ANGIO: CPT

## 2024-12-26 RX ADMIN — IOPAMIDOL ONE ML: 755 INJECTION, SOLUTION INTRAVENOUS at 07:43

## 2024-12-26 RX ADMIN — MIDAZOLAM ONE MG: 1 INJECTION INTRAMUSCULAR; INTRAVENOUS at 07:30

## 2024-12-26 RX ADMIN — METHYLENE BLUE ONE MLS: 10 INJECTION INTRAVENOUS at 07:21

## 2024-12-26 RX ADMIN — VERAPAMIL HYDROCHLORIDE ONE ML: 2.5 INJECTION, SOLUTION INTRAVENOUS at 07:34

## 2024-12-26 RX ADMIN — IOPAMIDOL ONE ML: 755 INJECTION, SOLUTION INTRAVENOUS at 07:45

## 2024-12-26 RX ADMIN — CEFAZOLIN ONE MLS: 330 INJECTION, POWDER, FOR SOLUTION INTRAMUSCULAR; INTRAVENOUS at 07:20

## 2024-12-26 RX ADMIN — POTASSIUM CHLORIDE ONE MLS: 14.9 INJECTION, SOLUTION INTRAVENOUS at 06:35

## 2024-12-26 RX ADMIN — LIDOCAINE HYDROCHLORIDE ONE ML: 10 INJECTION, SOLUTION INFILTRATION; PERINEURAL at 07:30

## 2024-12-26 RX ADMIN — ASPIRIN 325 MG ORAL TABLET STA MG: 325 PILL ORAL at 07:00

## 2024-12-26 RX ADMIN — FENTANYL CITRATE ONE MCG: 50 INJECTION, SOLUTION INTRAMUSCULAR; INTRAVENOUS at 07:34

## 2024-12-26 RX ADMIN — HEPARIN SODIUM ONE UNIT: 1000 INJECTION, SOLUTION INTRAVENOUS; SUBCUTANEOUS at 07:37

## 2024-12-26 RX ADMIN — CEFAZOLIN SCH MLS/HR: 330 INJECTION, POWDER, FOR SOLUTION INTRAMUSCULAR; INTRAVENOUS at 07:10

## 2024-12-26 RX ADMIN — NITROGLYCERIN ONE MG: 0.4 TABLET SUBLINGUAL at 07:38

## 2024-12-26 RX ADMIN — CEFAZOLIN ONE MLS: 330 INJECTION, POWDER, FOR SOLUTION INTRAMUSCULAR; INTRAVENOUS at 07:21

## 2024-12-26 NOTE — P.CARDCATH
Date of Procedure: 12/26/24


Operative Findings: 


History:


This is a 74-year-old lady with a history of smoking and paroxysmal symptomatic 

atrial fibrillation but quite sensitive to beta-blockers in terms of significant

bradycardia.  I performed a stress echo on this lady.  She maintained sinus 

rhythm but with exercise ejection fraction improvement was not optimal and 

resting ejection fraction was also lower with a cardiomyopathy type picture.  To

rule out obstructive CAD I recommended coronary angiography and the patient was 

74 years of age has hypertension smoking and probable cardiomyopathy of unclear 

etiology.  Risks benefits options and rationale were explained to the patient 

and .  They understood all details and wished to proceed with the 

procedure patient was referred for cardiac catheterization to evaluate for CAD. 





Procedure Details:


The risks, benefits, complications, treatment options, and expected outcomes 

were discussed with the patient. The patient and/or family concurred with the 

proposed plan, giving informed consent. Patient was brought to the cath lab 

after IV hydration was begun and oral premedication was given. 


Patient was further sedated with midazolam. 


Patient was prepped and draped in the usual manner. Under strict aseptic 

precautions and local anesthesia a 6 Hungarian introducer was placed in the right 

radial artery. Using a JL 3/5 and a JR 4/0 catheters I performed coronary 

angiography and the same JR catheter was used to check LV pressures and LV gram 

was not performed. 


After the procedure was completed the sheaths and catheters were all removed. 

Hemostasis was achieved with TR band. Saturation in the fingers of the right 

hand was about 95%. 





Moderate conscious sedation time was 22 minutes. Patient's oxygen saturation 

hemodynamics and EKG were monitored closely. 





. 





Findings:





Hemodynamics: The left ventricle end-diastolic pressure was 14 to 15 mmHg 

without any gradient across aortic valve


Left Main: Short patent vessel no significant disease bifurcates into LAD and 

circumflex


LAD: Good caliber vessel runs along the anterior wall tortuous gives off septal 

and diagonal branches in the distal portion at the junction of middle and distal

one third there is a 40% narrowing noted


CIRC: Nondominant vessel tortuous fair caliber minor irregularities no 

significant disease


RCA: Dominant vessel has minor irregularities no significant disease and 

distally bifurcates into PDA and PLV both of which supply a fair amount of 

myocardium no significant disease in the dominant RCA





LV: Not performed


Closure Device: TR band


Complications: None





Estimated Blood Loss: Minimal





Impression: This patient has a right dominant system slightly elevated filling 

pressures no gradient.  40% distal LAD disease with minor irregularities in the 

dominant RCA and nondominant circumflex.





Pre Procedure Diagnosis: Cardiomyopathy with abnormal stress test





Final Post Procedure Diagnosis: Nonischemic cardiomyopathy





Recommendation: Continued medical therapy, smoking cessation and other risk 

factor modification.  Discussed with patient and her .  Discharge later 

on today and will be seen in the office on the 30th





Complications: None; patient tolerated the procedure well. 


Disposition: Extended stay unit- hemodynamically stable. 


Condition: Stable


Discharge Disposition: Discharge patient home later on today.

## 2025-06-10 ENCOUNTER — HOSPITAL ENCOUNTER (OUTPATIENT)
Dept: HOSPITAL 47 - LABWHC1 | Age: 75
Discharge: HOME | End: 2025-06-10
Attending: INTERNAL MEDICINE
Payer: MEDICARE

## 2025-06-10 DIAGNOSIS — Z01.812: Primary | ICD-10-CM

## 2025-06-10 DIAGNOSIS — I48.91: ICD-10-CM

## 2025-06-10 LAB
ACANTHOCYTES BLD QL SMEAR: (no result)
AGRAN PLATELETS BLD QL SMEAR: (no result)
ANION GAP SERPL CALC-SCNC: 10.7 MMOL/L (ref 4–12)
ANISOCYTOSIS BLD QL SMEAR: (no result)
ANISOCYTOSIS BLD QL: (no result)
AUER BODIES BLD QL SMEAR: (no result)
BASO STIPL BLD QL SMEAR: (no result)
BUN SERPL-SCNC: 17.6 MG/DL (ref 9–27)
BURR CELLS BLD QL SMEAR: (no result)
BURR CELLS BLD QL SMEAR: (no result)
CHLORIDE SERPL-SCNC: 105 MMOL/L (ref 96–109)
CO2 SERPL-SCNC: 26.3 MMOL/L (ref 21.6–31.8)
CREATININE: 0.7 MG/DL (ref 0.6–1.5)
DACRYOCYTES BLD QL SMEAR: (no result)
DOHLE BOD BLD QL SMEAR: (no result)
ELLIPTOCYTES BLD QL SMEAR: (no result)
ERYTHROCYTE [DISTWIDTH] IN BLOOD BY AUTOMATED COUNT: 4.63 X 10*6/UL (ref 4.1–5.2)
ERYTHROCYTE [DISTWIDTH] IN BLOOD: 13.5 % (ref 11.5–14.5)
GIANT PLATELETS BLD QL SMEAR: (no result)
HCT VFR BLD AUTO: 43 % (ref 37.2–46.3)
HELMET CELLS BLD QL SMEAR: (no result)
HGB BLD-MCNC: 13.8 G/DL (ref 12–15)
HOWELL-JOLLY BOD BLD QL SMEAR: (no result)
HYPERCHROMASIA: (no result)
HYPOCHROMIA BLD QL SMEAR: (no result)
HYPOCHROMIA BLD QL: (no result)
LG PLATELETS BLD QL SMEAR: (no result)
Lab: (no result)
MACROCYTES BLD QL SMEAR: (no result)
MACROCYTES BLD QL: (no result)
MCH RBC QN AUTO: 29.8 PG (ref 27–32)
MCHC RBC AUTO-ENTMCNC: 32.1 G/DL (ref 32–37)
MCV RBC AUTO: 92.9 FL (ref 80–97)
MICROCYTES BLD QL SMEAR: (no result)
MICROCYTOSIS: (no result)
NEUTS HYPERSEG # BLD: (no result) 10*3/UL
NRBC BLD AUTO-RTO: 0 X 10*3/UL (ref 0–0.01)
OVALOCYTES BLD QL SMEAR: (no result)
PAPPENHEIMER BOD BLD QL SMEAR: (no result)
PARASITE [PRESENCE] IN BLOOD BY LIGHT MICROSCOPY: (no result)
PELGER HUET CELLS BLD QL SMEAR: (no result)
PLATELET # BLD AUTO: 265 X 10*3/UL (ref 140–440)
PLATELETS.RETICULATED NFR BLD AUTO: (no result) %
PMV BLD AUTO: 10.5 FL (ref 9.5–12.2)
POIKILOCYTOSIS BLD QL SMEAR: (no result)
POIKILOCYTOSIS BLD QL SMEAR: (no result)
POIKILOCYTOSIS: (no result)
POLYCHROMASIA BLD QL SMEAR: (no result)
POTASSIUM SERPL-SCNC: 3.7 MMOL/L (ref 3.5–5.5)
RBC AGGLUTINATION: (no result)
RBC MORPH BLD: (no result)
ROULEAUX BLD QL SMEAR: (no result)
SCHISTOCYTES BLD QL SMEAR: (no result)
SCHISTOCYTES BLD QL SMEAR: (no result)
SICKLE CELLS BLD QL SMEAR: (no result)
SMUDGE CELLS BLD QL SMEAR: (no result)
SODIUM SERPL-SCNC: 142 MMOL/L (ref 135–145)
SPHEROCYTES BLD QL SMEAR: (no result)
STOMATOCYTES BLD QL SMEAR: (no result)
TARGETS BLD QL SMEAR: (no result)
TOXIC GRANULES BLD QL SMEAR: (no result)
VARIANT LYMPHS BLD QL SMEAR: (no result)
VARIANT LYMPHS BLD QL SMEAR: (no result)
WBC # BLD AUTO: 8.18 X 10*3/UL (ref 4.5–10)
WBC NRBC COR # BLD: (no result) K/UL
WBC TOXIC VACUOLES BLD QL SMEAR: (no result)

## 2025-06-10 PROCEDURE — 84520 ASSAY OF UREA NITROGEN: CPT

## 2025-06-10 PROCEDURE — 85027 COMPLETE CBC AUTOMATED: CPT

## 2025-06-10 PROCEDURE — 80051 ELECTROLYTE PANEL: CPT

## 2025-06-10 PROCEDURE — 82565 ASSAY OF CREATININE: CPT

## 2025-06-19 ENCOUNTER — HOSPITAL ENCOUNTER (OUTPATIENT)
Dept: HOSPITAL 47 - CATHEP | Age: 75
LOS: 1 days | Discharge: HOME | End: 2025-06-20
Attending: INTERNAL MEDICINE
Payer: MEDICARE

## 2025-06-19 VITALS — BODY MASS INDEX: 19.9 KG/M2

## 2025-06-19 DIAGNOSIS — Z79.01: ICD-10-CM

## 2025-06-19 DIAGNOSIS — I48.11: Primary | ICD-10-CM

## 2025-06-19 DIAGNOSIS — Z98.51: ICD-10-CM

## 2025-06-19 DIAGNOSIS — Z79.899: ICD-10-CM

## 2025-06-19 DIAGNOSIS — H91.90: ICD-10-CM

## 2025-06-19 DIAGNOSIS — F17.200: ICD-10-CM

## 2025-06-19 DIAGNOSIS — I10: ICD-10-CM

## 2025-06-19 DIAGNOSIS — E78.5: ICD-10-CM

## 2025-06-19 DIAGNOSIS — I25.10: ICD-10-CM

## 2025-06-19 LAB
ALBUMIN SERPL-MCNC: 4.5 G/DL (ref 3.5–5)
ALP SERPL-CCNC: 72 U/L (ref 38–126)
ALT SERPL-CCNC: 14 U/L (ref 4–34)
ANION GAP SERPL CALC-SCNC: 9 MMOL/L
AST SERPL-CCNC: 41 U/L (ref 14–36)
BILIRUB BLD-MCNC: 0.8 MG/DL (ref 0.2–1.3)
BUN SERPL-SCNC: 17 MG/DL (ref 7–17)
CALCIUM SPEC-MCNC: 9.9 MG/DL (ref 8.4–10.2)
CHLORIDE SERPL-SCNC: 102 MMOL/L (ref 98–107)
CO2 SERPL-SCNC: 29 MMOL/L (ref 22–30)
CREATININE: 0.7 MG/DL (ref 0.52–1.04)
GLUCOSE SERPL-MCNC: 116 MG/DL (ref 74–99)
POTASSIUM SERPL-SCNC: 3.4 MMOL/L (ref 3.5–5.1)
PROT SERPL-MCNC: 6.9 G/DL (ref 6.3–8.2)
SODIUM SERPL-SCNC: 140 MMOL/L (ref 137–145)
TSH SERPL DL<=0.005 MIU/L-ACNC: 4.12 MIU/L (ref 0.47–4.68)

## 2025-06-19 PROCEDURE — 86850 RBC ANTIBODY SCREEN: CPT

## 2025-06-19 PROCEDURE — 80053 COMPREHEN METABOLIC PANEL: CPT

## 2025-06-19 PROCEDURE — 86900 BLOOD TYPING SEROLOGIC ABO: CPT

## 2025-06-19 PROCEDURE — 93656 COMPRE EP EVAL ABLTJ ATR FIB: CPT

## 2025-06-19 PROCEDURE — 86901 BLOOD TYPING SEROLOGIC RH(D): CPT

## 2025-06-19 PROCEDURE — 84443 ASSAY THYROID STIM HORMONE: CPT

## 2025-06-19 PROCEDURE — 93657 TX L/R ATRIAL FIB ADDL: CPT

## 2025-06-19 PROCEDURE — 92960 CARDIOVERSION ELECTRIC EXT: CPT

## 2025-06-19 RX ADMIN — HEPARIN SODIUM ONE MLS: 10000 INJECTION, SOLUTION INTRAVENOUS at 08:00

## 2025-06-19 RX ADMIN — ACETAMINOPHEN ONE MLS/HR: 10 INJECTION, SOLUTION INTRAVENOUS at 12:03

## 2025-06-19 RX ADMIN — POTASSIUM CHLORIDE ONE MLS: 14.9 INJECTION, SOLUTION INTRAVENOUS at 06:15

## 2025-06-19 RX ADMIN — VALSARTAN SCH MG: 160 TABLET, FILM COATED ORAL at 23:07

## 2025-06-19 RX ADMIN — CEFAZOLIN SCH MLS/HR: 330 INJECTION, POWDER, FOR SOLUTION INTRAMUSCULAR; INTRAVENOUS at 06:50

## 2025-06-19 RX ADMIN — MIDAZOLAM ONE MG: 1 INJECTION INTRAMUSCULAR; INTRAVENOUS at 06:53

## 2025-06-19 RX ADMIN — APIXABAN SCH MG: 5 TABLET, FILM COATED ORAL at 23:06

## 2025-06-19 RX ADMIN — PRAVASTATIN SODIUM SCH MG: 20 TABLET ORAL at 23:07

## 2025-06-19 RX ADMIN — POTASSIUM CHLORIDE SCH: 14.9 INJECTION, SOLUTION INTRAVENOUS at 12:03

## 2025-06-19 RX ADMIN — IOPAMIDOL ONE ML: 755 INJECTION, SOLUTION INTRAVENOUS at 09:50

## 2025-06-19 RX ADMIN — LIDOCAINE HYDROCHLORIDE ONE ML: 10 INJECTION, SOLUTION INFILTRATION; PERINEURAL at 08:10

## 2025-06-20 VITALS
SYSTOLIC BLOOD PRESSURE: 133 MMHG | DIASTOLIC BLOOD PRESSURE: 68 MMHG | RESPIRATION RATE: 18 BRPM | HEART RATE: 68 BPM | TEMPERATURE: 99.7 F

## 2025-06-20 RX ADMIN — AMLODIPINE BESYLATE SCH MG: 5 TABLET ORAL at 08:42

## 2025-07-09 ENCOUNTER — HOSPITAL ENCOUNTER (OUTPATIENT)
Dept: HOSPITAL 47 - LABWHC1 | Age: 75
Discharge: HOME | End: 2025-07-09
Attending: STUDENT IN AN ORGANIZED HEALTH CARE EDUCATION/TRAINING PROGRAM
Payer: MEDICARE

## 2025-07-09 DIAGNOSIS — E11.9: Primary | ICD-10-CM

## 2025-07-09 DIAGNOSIS — I50.9: ICD-10-CM

## 2025-07-09 PROCEDURE — 36415 COLL VENOUS BLD VENIPUNCTURE: CPT

## 2025-07-09 PROCEDURE — 85652 RBC SED RATE AUTOMATED: CPT

## 2025-07-09 PROCEDURE — 83036 HEMOGLOBIN GLYCOSYLATED A1C: CPT

## 2025-07-09 PROCEDURE — 86140 C-REACTIVE PROTEIN: CPT

## 2025-07-09 PROCEDURE — 83880 ASSAY OF NATRIURETIC PEPTIDE: CPT

## 2025-07-10 LAB
CRP SERPL-MCNC: <0.3 MG/DL (ref 0–0.8)
EST. AVERAGE GLUCOSE BLD GHB EST-MCNC: 126 MG/DL
NT-PROBNP SERPL-MCNC: 552 PG/ML (ref 0–450)